# Patient Record
Sex: FEMALE | Race: WHITE | NOT HISPANIC OR LATINO | URBAN - METROPOLITAN AREA
[De-identification: names, ages, dates, MRNs, and addresses within clinical notes are randomized per-mention and may not be internally consistent; named-entity substitution may affect disease eponyms.]

---

## 2022-03-14 ENCOUNTER — ESTABLISHED PATIENT (OUTPATIENT)
Dept: URBAN - METROPOLITAN AREA CLINIC 14 | Facility: CLINIC | Age: 27
End: 2022-03-14

## 2022-03-14 DIAGNOSIS — H04.123: ICD-10-CM

## 2022-03-14 DIAGNOSIS — H52.223: ICD-10-CM

## 2022-03-14 DIAGNOSIS — H52.13: ICD-10-CM

## 2022-03-14 PROCEDURE — 92015 DETERMINE REFRACTIVE STATE: CPT

## 2022-03-14 PROCEDURE — 92014 COMPRE OPH EXAM EST PT 1/>: CPT

## 2022-03-14 ASSESSMENT — KERATOMETRY
OS_AXISANGLE2_DEGREES: 88
OS_K2POWER_DIOPTERS: 42.50
OD_K1POWER_DIOPTERS: 41.25
OD_K2POWER_DIOPTERS: 42.25
OS_AXISANGLE_DEGREES: 178
OD_AXISANGLE_DEGREES: 169
OS_K1POWER_DIOPTERS: 41.25
OD_AXISANGLE2_DEGREES: 79

## 2022-03-14 ASSESSMENT — TONOMETRY
OS_IOP_MMHG: 8
OD_IOP_MMHG: 12

## 2022-03-14 ASSESSMENT — VISUAL ACUITY
OD_SC: 20/20
OS_SC: 20/20

## 2022-03-14 NOTE — PATIENT DISCUSSION
Ed. patient. Ocular health WNL. Recommend Sync lenses for studying vs. +1.00 OTC readers to reduce eye strain. Glasses Rx given. Monitor.